# Patient Record
Sex: MALE | Race: OTHER | HISPANIC OR LATINO | URBAN - METROPOLITAN AREA
[De-identification: names, ages, dates, MRNs, and addresses within clinical notes are randomized per-mention and may not be internally consistent; named-entity substitution may affect disease eponyms.]

---

## 2017-08-05 ENCOUNTER — EMERGENCY (EMERGENCY)
Facility: HOSPITAL | Age: 42
LOS: 1 days | Discharge: HOME | End: 2017-08-05
Attending: EMERGENCY MEDICINE
Payer: COMMERCIAL

## 2017-08-05 VITALS
SYSTOLIC BLOOD PRESSURE: 135 MMHG | HEART RATE: 72 BPM | RESPIRATION RATE: 16 BRPM | DIASTOLIC BLOOD PRESSURE: 81 MMHG | HEIGHT: 64 IN | OXYGEN SATURATION: 99 % | TEMPERATURE: 98 F | WEIGHT: 186.95 LBS

## 2017-08-05 VITALS
OXYGEN SATURATION: 98 % | TEMPERATURE: 99 F | HEART RATE: 56 BPM | SYSTOLIC BLOOD PRESSURE: 123 MMHG | RESPIRATION RATE: 20 BRPM | DIASTOLIC BLOOD PRESSURE: 76 MMHG

## 2017-08-05 LAB
ANION GAP SERPL CALC-SCNC: 14 MMOL/L — SIGNIFICANT CHANGE UP (ref 5–17)
BASOPHILS # BLD AUTO: 0 K/UL — SIGNIFICANT CHANGE UP (ref 0–0.2)
BASOPHILS NFR BLD AUTO: 0.2 % — SIGNIFICANT CHANGE UP (ref 0–2)
BUN SERPL-MCNC: 6 MG/DL — LOW (ref 8–20)
CALCIUM SERPL-MCNC: 9.1 MG/DL — SIGNIFICANT CHANGE UP (ref 8.6–10.2)
CHLORIDE SERPL-SCNC: 96 MMOL/L — LOW (ref 98–107)
CO2 SERPL-SCNC: 24 MMOL/L — SIGNIFICANT CHANGE UP (ref 22–29)
CREAT SERPL-MCNC: 0.72 MG/DL — SIGNIFICANT CHANGE UP (ref 0.5–1.3)
EOSINOPHIL # BLD AUTO: 0.2 K/UL — SIGNIFICANT CHANGE UP (ref 0–0.5)
EOSINOPHIL NFR BLD AUTO: 1.5 % — SIGNIFICANT CHANGE UP (ref 0–5)
GLUCOSE SERPL-MCNC: 105 MG/DL — SIGNIFICANT CHANGE UP (ref 70–115)
HCT VFR BLD CALC: 41.1 % — LOW (ref 42–52)
HGB BLD-MCNC: 15.1 G/DL — SIGNIFICANT CHANGE UP (ref 14–18)
LYMPHOCYTES # BLD AUTO: 2.8 K/UL — SIGNIFICANT CHANGE UP (ref 1–4.8)
LYMPHOCYTES # BLD AUTO: 24.6 % — SIGNIFICANT CHANGE UP (ref 20–55)
MCHC RBC-ENTMCNC: 31.2 PG — HIGH (ref 27–31)
MCHC RBC-ENTMCNC: 36.7 G/DL — HIGH (ref 32–36)
MCV RBC AUTO: 84.9 FL — SIGNIFICANT CHANGE UP (ref 80–94)
MONOCYTES # BLD AUTO: 0.6 K/UL — SIGNIFICANT CHANGE UP (ref 0–0.8)
MONOCYTES NFR BLD AUTO: 4.9 % — SIGNIFICANT CHANGE UP (ref 3–10)
NEUTROPHILS # BLD AUTO: 7.9 K/UL — SIGNIFICANT CHANGE UP (ref 1.8–8)
NEUTROPHILS NFR BLD AUTO: 68.5 % — SIGNIFICANT CHANGE UP (ref 37–73)
PLAT MORPH BLD: NORMAL — SIGNIFICANT CHANGE UP
PLATELET # BLD AUTO: 243 K/UL — SIGNIFICANT CHANGE UP (ref 150–400)
POTASSIUM SERPL-MCNC: 4.3 MMOL/L — SIGNIFICANT CHANGE UP (ref 3.5–5.3)
POTASSIUM SERPL-SCNC: 4.3 MMOL/L — SIGNIFICANT CHANGE UP (ref 3.5–5.3)
RBC # BLD: 4.84 M/UL — SIGNIFICANT CHANGE UP (ref 4.6–6.2)
RBC # FLD: 11.9 % — SIGNIFICANT CHANGE UP (ref 11–15.6)
RBC BLD AUTO: SIGNIFICANT CHANGE UP
SODIUM SERPL-SCNC: 134 MMOL/L — LOW (ref 135–145)
TROPONIN T SERPL-MCNC: <0.01 NG/ML — SIGNIFICANT CHANGE UP (ref 0–0.06)
WBC # BLD: 11.6 K/UL — HIGH (ref 4.8–10.8)
WBC # FLD AUTO: 11.6 K/UL — HIGH (ref 4.8–10.8)

## 2017-08-05 PROCEDURE — 85027 COMPLETE CBC AUTOMATED: CPT

## 2017-08-05 PROCEDURE — 84484 ASSAY OF TROPONIN QUANT: CPT

## 2017-08-05 PROCEDURE — 99284 EMERGENCY DEPT VISIT MOD MDM: CPT

## 2017-08-05 PROCEDURE — 36415 COLL VENOUS BLD VENIPUNCTURE: CPT

## 2017-08-05 PROCEDURE — 71046 X-RAY EXAM CHEST 2 VIEWS: CPT

## 2017-08-05 PROCEDURE — 71020: CPT | Mod: 26

## 2017-08-05 PROCEDURE — 93005 ELECTROCARDIOGRAM TRACING: CPT

## 2017-08-05 PROCEDURE — 93010 ELECTROCARDIOGRAM REPORT: CPT

## 2017-08-05 PROCEDURE — 99283 EMERGENCY DEPT VISIT LOW MDM: CPT | Mod: 25

## 2017-08-05 PROCEDURE — 80048 BASIC METABOLIC PNL TOTAL CA: CPT

## 2017-08-05 NOTE — ED STATDOCS - OBJECTIVE STATEMENT
43 y/o M w/ PMHx of anxiety and HTN presents to the ED c/o anxiety and constant chest pressure s/p mowing his lawn at 14:00 today. Pt states he had tingling in his L hand, tinnitus, loose stools, and nausea. He states he worries more when he is alone which he was today. He takes Atenolol and Sertraline. Pt denies diaphoresis, abd pain, fever, chills, SOB or any other complaints at this time. 41 y/o M w/ PMHx of anxiety and HTN presents to the ED c/o anxiety and constant chest pressure since 2pm.  Pt states he had tingling in his L hand, loose stools, and nausea. He states he worries more when he is alone which he was today. He takes Atenolol and Sertraline. Pt denies diaphoresis, abd pain, fever, chills, SOB or any other complaints at this time. notes that felt like his typical anxiety but his cp lasted a little longer today, notes that pain is still there but getting better

## 2017-08-05 NOTE — ED STATDOCS - ATTENDING CONTRIBUTION TO CARE
I, Shea Plaza, performed the initial face to face bedside interview with this patient regarding history of present illness, review of symptoms and relevant past medical, social and family history.  I completed an independent physical examination.  I was the initial provider who evaluated this patient. I have signed out the follow up of any pending tests (i.e. labs, radiological studies) to the ACP.  I have communicated the patient’s plan of care and disposition with the ACP.

## 2017-08-05 NOTE — ED STATDOCS - MEDICAL DECISION MAKING DETAILS
most likely anxiety however pt notes that cp lasted a little longer so will f/u 1 trop to r/o for acs, cbc bmp cxr ekg if neg will dc

## 2017-08-05 NOTE — ED STATDOCS - PROGRESS NOTE DETAILS
pt seen and evaluated by intake doctor. Agree with intake hpi and pe. ekg reviewed by attending. labs reviewed. PT REPORTS HE IS WITHOUT PAIN OR CHEST PRESSURE AT THIS TIME. pt counseled on results and given a copy. pt given anticipatory guidance and advised to follow up with pmd. pt verbalized understanding and agreement with plan and dx will dc

## 2017-08-05 NOTE — ED STATDOCS - CHPI ED SYMPTOM POS
anxiety, tingling, chest pressure, loose stools, and tinnitus/NAUSEA NAUSEA/anxiety, tingling, chest pressure, loose stools,

## 2017-08-05 NOTE — ED ADULT TRIAGE NOTE - CHIEF COMPLAINT QUOTE
pt presents to ED with anxiety x few hours. pt c/o feeling flushed. breathing si even and unlabored. pt states he feels like his BP is high. a&ox3

## 2023-04-04 ENCOUNTER — OFFICE (OUTPATIENT)
Dept: URBAN - METROPOLITAN AREA CLINIC 38 | Facility: CLINIC | Age: 48
Setting detail: OPHTHALMOLOGY
End: 2023-04-04
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
## 2023-04-04 PROCEDURE — NO SHOW FE NO SHOW FEE: Performed by: OPHTHALMOLOGY

## 2023-11-22 ENCOUNTER — OFFICE (OUTPATIENT)
Dept: URBAN - METROPOLITAN AREA CLINIC 38 | Facility: CLINIC | Age: 48
Setting detail: OPHTHALMOLOGY
End: 2023-11-22
Payer: COMMERCIAL

## 2023-11-22 DIAGNOSIS — H52.4: ICD-10-CM

## 2023-11-22 DIAGNOSIS — H43.393: ICD-10-CM

## 2023-11-22 PROCEDURE — 92014 COMPRE OPH EXAM EST PT 1/>: CPT

## 2023-11-22 PROCEDURE — 92015 DETERMINE REFRACTIVE STATE: CPT

## 2023-11-22 ASSESSMENT — REFRACTION_MANIFEST
OU_VA: 20/20
OD_ADD: +1.50
OD_VA2: 20/20(J1+)
OS_VA2: 20/20(J1+)
OS_AXIS: 075
OS_SPHERE: PLANO
OD_CYLINDER: -1.50
OS_CYLINDER: -1.25
OS_ADD: +1.50
OD_SPHERE: PLANO
OD_VA1: 20/20
OS_VA2: 20/20(J1+)
OD_CYLINDER: -1.25
OS_AXIS: 085
OS_CYLINDER: -1.50
OD_AXIS: 090
OS_VA1: 20/20
OS_ADD: +1.75
OD_AXIS: 090
OS_SPHERE: PLANO
OD_ADD: +1.75
OU_VA: 20/20
OD_VA1: 20/20
OD_SPHERE: PLANO
OS_VA1: 20/20
OD_VA2: 20/20(J1+)

## 2023-11-22 ASSESSMENT — REFRACTION_AUTOREFRACTION
OD_SPHERE: +0.50
OD_CYLINDER: -1.50
OS_SPHERE: +0.50
OD_AXIS: 090
OS_CYLINDER: -1.50
OS_AXIS: 085

## 2023-11-22 ASSESSMENT — REFRACTION_CURRENTRX
OS_ADD: +1.50
OS_OVR_VA: 20/
OD_SPHERE: PLANO
OS_CYLINDER: -1.25
OD_CYLINDER: -1.25
OS_VPRISM_DIRECTION: PROGS
OD_VPRISM_DIRECTION: PROGS
OD_AXIS: 090
OS_SPHERE: PLANO
OD_ADD: +1.50
OS_AXIS: 075
OD_OVR_VA: 20/

## 2023-11-22 ASSESSMENT — SPHEQUIV_DERIVED
OS_SPHEQUIV: -0.25
OD_SPHEQUIV: -0.25

## 2023-11-22 ASSESSMENT — CONFRONTATIONAL VISUAL FIELD TEST (CVF)
OD_FINDINGS: FULL
OS_FINDINGS: FULL

## 2024-12-16 ENCOUNTER — OFFICE (OUTPATIENT)
Dept: URBAN - METROPOLITAN AREA CLINIC 38 | Facility: CLINIC | Age: 49
Setting detail: OPHTHALMOLOGY
End: 2024-12-16
Payer: COMMERCIAL

## 2024-12-16 DIAGNOSIS — H52.4: ICD-10-CM

## 2024-12-16 DIAGNOSIS — H43.393: ICD-10-CM

## 2024-12-16 PROCEDURE — 92014 COMPRE OPH EXAM EST PT 1/>: CPT

## 2024-12-16 PROCEDURE — 92015 DETERMINE REFRACTIVE STATE: CPT

## 2024-12-16 ASSESSMENT — REFRACTION_CURRENTRX
OS_SPHERE: PLANO
OS_VPRISM_DIRECTION: PROGS
OD_CYLINDER: -1.25
OS_AXIS: 075
OD_SPHERE: PLANO
OD_OVR_VA: 20/
OS_OVR_VA: 20/
OD_AXIS: 090
OD_ADD: +1.50
OS_CYLINDER: -1.25
OD_VPRISM_DIRECTION: PROGS
OS_ADD: +1.50

## 2024-12-16 ASSESSMENT — REFRACTION_MANIFEST
OD_CYLINDER: -1.25
OD_VA1: 20/20
OS_AXIS: 085
OD_VA1: 20/20
OD_ADD: +1.75
OD_SPHERE: PLANO
OS_ADD: +1.75
OS_VA1: 20/20
OD_VA2: 20/20(J1+)
OS_SPHERE: PLANO
OD_AXIS: 090
OD_CYLINDER: -1.25
OS_VA1: 20/20
OS_SPHERE: PLANO
OS_CYLINDER: -1.25
OD_SPHERE: PLANO
OS_ADD: +1.50
OD_VA2: 20/20(J1+)
OS_VA2: 20/20(J1+)
OU_VA: 20/20
OS_CYLINDER: -1.25
OU_VA: 20/20
OS_AXIS: 085
OD_ADD: +1.50
OD_AXIS: 095
OS_VA2: 20/20(J1+)

## 2024-12-16 ASSESSMENT — REFRACTION_AUTOREFRACTION
OD_CYLINDER: -1.75
OS_AXIS: 085
OD_AXIS: 093
OD_SPHERE: +0.50
OS_SPHERE: +0.50
OS_CYLINDER: -1.50

## 2024-12-16 ASSESSMENT — VISUAL ACUITY
OS_BCVA: 20/20-
OD_BCVA: 20/20

## 2024-12-16 ASSESSMENT — TONOMETRY
OD_IOP_MMHG: 13
OS_IOP_MMHG: 12

## 2024-12-16 ASSESSMENT — KERATOMETRY
OS_AXISANGLE_DEGREES: 169
OD_K1POWER_DIOPTERS: 42.00
OS_K2POWER_DIOPTERS: 42.50
OD_K2POWER_DIOPTERS: 42.75
OD_AXISANGLE_DEGREES: 004
METHOD_AUTO_MANUAL: AUTO
OS_K1POWER_DIOPTERS: 42.25

## 2024-12-16 ASSESSMENT — CONFRONTATIONAL VISUAL FIELD TEST (CVF)
OS_FINDINGS: FULL
OD_FINDINGS: FULL